# Patient Record
Sex: MALE | Race: WHITE
[De-identification: names, ages, dates, MRNs, and addresses within clinical notes are randomized per-mention and may not be internally consistent; named-entity substitution may affect disease eponyms.]

---

## 2018-09-17 ENCOUNTER — HOSPITAL ENCOUNTER (INPATIENT)
Dept: HOSPITAL 74 - JER | Age: 53
Discharge: HOME | DRG: 861 | End: 2018-09-17
Attending: INTERNAL MEDICINE | Admitting: INTERNAL MEDICINE
Payer: COMMERCIAL

## 2018-09-17 VITALS — DIASTOLIC BLOOD PRESSURE: 84 MMHG | HEART RATE: 61 BPM | TEMPERATURE: 98.1 F | SYSTOLIC BLOOD PRESSURE: 150 MMHG

## 2018-09-17 VITALS — BODY MASS INDEX: 30.9 KG/M2

## 2018-09-17 DIAGNOSIS — Z79.4: ICD-10-CM

## 2018-09-17 DIAGNOSIS — R60.0: Primary | ICD-10-CM

## 2018-09-17 DIAGNOSIS — Z87.891: ICD-10-CM

## 2018-09-17 DIAGNOSIS — E78.5: ICD-10-CM

## 2018-09-17 DIAGNOSIS — I11.9: ICD-10-CM

## 2018-09-17 DIAGNOSIS — E11.40: ICD-10-CM

## 2018-09-17 DIAGNOSIS — E11.9: ICD-10-CM

## 2018-09-17 DIAGNOSIS — T39.395A: ICD-10-CM

## 2018-09-17 LAB
ALBUMIN SERPL-MCNC: 3.4 G/DL (ref 3.4–5)
ALP SERPL-CCNC: 62 U/L (ref 45–117)
ALT SERPL-CCNC: 42 U/L (ref 13–61)
ANION GAP SERPL CALC-SCNC: 9 MMOL/L (ref 8–16)
AST SERPL-CCNC: 25 U/L (ref 15–37)
BASOPHILS # BLD: 1 % (ref 0–2)
BILIRUB SERPL-MCNC: 0.4 MG/DL (ref 0.2–1)
BNP SERPL-MCNC: 440.01 PG/ML (ref 5–125)
BUN SERPL-MCNC: 11 MG/DL (ref 7–18)
CALCIUM SERPL-MCNC: 8.4 MG/DL (ref 8.5–10.1)
CHLORIDE SERPL-SCNC: 105 MMOL/L (ref 98–107)
CO2 SERPL-SCNC: 28 MMOL/L (ref 21–32)
CREAT SERPL-MCNC: 0.7 MG/DL (ref 0.55–1.3)
DEPRECATED RDW RBC AUTO: 14.5 % (ref 11.9–15.9)
EOSINOPHIL # BLD: 2.3 % (ref 0–4.5)
GLUCOSE SERPL-MCNC: 90 MG/DL (ref 74–106)
HCT VFR BLD CALC: 35.5 % (ref 35.4–49)
HGB BLD-MCNC: 11.6 GM/DL (ref 11.7–16.9)
INR BLD: 0.93 (ref 0.83–1.09)
LYMPHOCYTES # BLD: 17.5 % (ref 8–40)
MAGNESIUM SERPL-MCNC: 2.2 MG/DL (ref 1.8–2.4)
MCH RBC QN AUTO: 28.4 PG (ref 25.7–33.7)
MCHC RBC AUTO-ENTMCNC: 32.8 G/DL (ref 32–35.9)
MCV RBC: 86.7 FL (ref 80–96)
MONOCYTES # BLD AUTO: 9.5 % (ref 3.8–10.2)
NEUTROPHILS # BLD: 69.7 % (ref 42.8–82.8)
PLATELET # BLD AUTO: 226 K/MM3 (ref 134–434)
PMV BLD: 8.5 FL (ref 7.5–11.1)
POTASSIUM SERPLBLD-SCNC: 4.2 MMOL/L (ref 3.5–5.1)
PROT SERPL-MCNC: 6.2 G/DL (ref 6.4–8.2)
PT PNL PPP: 10.5 SEC (ref 9.7–13)
RBC # BLD AUTO: 4.1 M/MM3 (ref 4–5.6)
SODIUM SERPL-SCNC: 142 MMOL/L (ref 136–145)
WBC # BLD AUTO: 8.4 K/MM3 (ref 4–10)

## 2018-09-17 PROCEDURE — G0378 HOSPITAL OBSERVATION PER HR: HCPCS

## 2018-09-17 NOTE — HP
Admitting History and Physical





- Primary Care Physician


PCP: Analia Bliss





- Admission


Chief Complaint: sob


History of Present Illness: 





52M with history of with history of IDDM, HTN, and neuropathy here today 

complaining of bilateral leg edema for the past week. He endorses associated 

shortness of breath, dyspnea on exertion and worsening shortness of breath when 

laying flat. Denies chest pain at this time, but states that he has had issues 

with chest pain recently. Denies fevers, chills, nausea, vomiting. Denies prior 

blood clots. Denies history of CHF and taking lasix.





 





- Past Medical History


Cardiovascular: Yes: HTN, Hyperlipdemia


Endocrine: Yes: Diabetes Mellitus





- Advance Directives


Advance Directives: Yes: Living Will





- Smoking History


Smoking history: Former smoker


Have you smoked in the past 12 months: Yes


Aproximately how many cigarettes per day: 4





- Alcohol/Substance Use


Hx Alcohol Use: No





Home Medications





- Allergies


Allergies/Adverse Reactions: 


 Allergies











Allergy/AdvReac Type Severity Reaction Status Date / Time


 


No Known Allergies Allergy   Verified 09/17/18 09:33














- Home Medications


Home Medications: 


Ambulatory Orders





Albuterol Sulfate Inhaler - [Ventolin Hfa Inhaler -] 1 - 2 inh PO Q4H PRN 09/17/ 18 


Atorvastatin Ca [Lipitor] 40 mg PO HS 09/17/18 


Enalapril Maleate [Vasotec -] 5 mg PO DAILY 09/17/18 


Escitalopram Oxalate [Lexapro -] 20 mg PO DAILY 09/17/18 


Fluticasone Prop 0.05% Nasal [Flonase -] 1 - 2 spray NS DAILY 09/17/18 


Fluticasone Propionate [Flovent Diskus] 2 puff IH BID 09/17/18 


Gabapentin 800 mg PO BID PRN 09/17/18 


Insulin Detemir [Levemir Flextouch] 10 unit SQ HS 09/17/18 


Insulin Glargine,Hum.rec.anlog [Lantus] 0 unit SQ ASDIR 09/17/18 


Loratadine 10 mg PO 09/17/18 


Loratadine [Claritin -] 10 mg PO DAILY 09/17/18 


Meloxicam [Mobic] 15 mg PO DAILY 09/17/18 


Omeprazole 40 mg PO DAILY 09/17/18 


Pramipexole Di-HCl [Pramipexole Dihydrochloride] 0.125 mg PO DAILY 09/17/18 











Physical Examination


Vital Signs: 


 Vital Signs











Temperature  98.0 F   09/17/18 16:57


 


Pulse Rate  63   09/17/18 16:57


 


Respiratory Rate  20   09/17/18 16:57


 


Blood Pressure  143/91   09/17/18 16:57


 


O2 Sat by Pulse Oximetry (%)  94 L  09/17/18 15:09











Constitutional: Yes: No Distress


HENT: Yes: Atraumatic


Neck: Yes: Supple


Cardiovascular: Yes: Regular Rate and Rhythm


Respiratory: Yes: CTA Bilaterally


Gastrointestinal: Yes: Normal Bowel Sounds


Extremities: Yes: WNL


Edema: No


Peripheral Pulses WNL: Yes


Neurological: Yes: Alert, Oriented


Labs: 


 CBC, BMP





 09/17/18 10:00 





 09/17/18 10:00 











Problem List





- Problems


(1) HTN (hypertension)


Assessment/Plan: 


continue home meds


cardiology note reviewed





Code(s): I10 - ESSENTIAL (PRIMARY) HYPERTENSION   


Qualifiers: 


   Hypertension type: essential hypertension   Qualified Code(s): I10 - 

Essential (primary) hypertension   





(2) Diabetes


Code(s): E11.9 - TYPE 2 DIABETES MELLITUS WITHOUT COMPLICATIONS   


Qualifiers: 


   Diabetes mellitus long term insulin use: with long term use   Diabetes 

mellitus complication detail: with unspecified neuropathy 





(3) CHF (congestive heart failure)


Assessment/Plan: 


pt stable


no peripheral edema or rales


Code(s): I50.9 - HEART FAILURE, UNSPECIFIED   





Assessment/Plan





 Laboratory Tests











  09/17/18 09/17/18 09/17/18





  10:00 10:00 10:00


 


WBC  8.4  


 


RBC  4.10  


 


Hgb  11.6 L  


 


Hct  35.5  


 


MCV  86.7  


 


MCH  28.4  


 


MCHC  32.8  


 


RDW  14.5  


 


Plt Count  226  D  


 


MPV  8.5  


 


Absolute Neuts (auto)  5.8  


 


Neutrophils %  69.7  


 


Lymphocytes %  17.5  


 


Monocytes %  9.5  


 


Eosinophils %  2.3  D  


 


Basophils %  1.0  D  


 


Nucleated RBC %  0  


 


PT with INR   10.50 


 


INR   0.93 


 


Sodium    142


 


Potassium    4.2


 


Chloride    105


 


Carbon Dioxide    28


 


Anion Gap    9


 


BUN    11


 


Creatinine    0.7


 


Creat Clearance w eGFR    > 60


 


POC Glucometer   


 


Random Glucose    90


 


Calcium    8.4 L


 


Magnesium    2.2


 


Total Bilirubin    0.4


 


AST    25


 


ALT    42


 


Alkaline Phosphatase    62


 


Creatine Kinase    153


 


Creatine Kinase Index    1.1


 


CK-MB (CK-2)    1.72


 


Troponin I    < 0.02


 


B-Natriuretic Peptide    440.01 H


 


Total Protein    6.2 L


 


Albumin    3.4














  09/17/18 09/17/18





  10:00 16:19


 


WBC  


 


RBC  


 


Hgb  


 


Hct  


 


MCV  


 


MCH  


 


MCHC  


 


RDW  


 


Plt Count  


 


MPV  


 


Absolute Neuts (auto)  


 


Neutrophils %  


 


Lymphocytes %  


 


Monocytes %  


 


Eosinophils %  


 


Basophils %  


 


Nucleated RBC %  


 


PT with INR  


 


INR  


 


Sodium  


 


Potassium  


 


Chloride  


 


Carbon Dioxide  


 


Anion Gap  


 


BUN  


 


Creatinine  


 


Creat Clearance w eGFR  


 


POC Glucometer   84


 


Random Glucose  


 


Calcium  


 


Magnesium  


 


Total Bilirubin  


 


AST  


 


ALT  


 


Alkaline Phosphatase  


 


Creatine Kinase  


 


Creatine Kinase Index  


 


CK-MB (CK-2)  


 


Troponin I  


 


B-Natriuretic Peptide  Cancelled 


 


Total Protein  


 


Albumin  








Active Medications











Generic Name Dose Route Start Last Admin





  Trade Name Freq  PRN Reason Stop Dose Admin


 


Atorvastatin Calcium  40 mg  09/17/18 22:00  09/17/18 21:04





  Lipitor -  PO   40 mg





  [facility] SALAS   Administration





     





     





     





     


 


Enalapril Maleate  5 mg  09/18/18 10:00  





  Vasotec -  PO   





  DAILY SALAS   





     





     





     





     


 


Escitalopram Oxalate  20 mg  09/18/18 10:00  





  Lexapro -  PO   





  DAILY SALAS   





     





     





     





     


 


Gabapentin  800 mg  09/17/18 17:03  09/17/18 21:03





  Neurontin -  PO   800 mg





  BID PRN   Administration





  neuropathy   





     





     





     


 


Insulin Aspart  1 vial  09/17/18 22:00  09/17/18 21:13





  Novolog Vial Sliding Scale -  SQ   Not Given





  [facility]   





     





     





  Protocol   





     


 


Insulin Detemir  10 units  09/17/18 22:00  09/17/18 21:04





  Levemir Vial  SQ   10 unit





  HS SALAS   Administration





     





     





     





     


 


Mometasone Furoate  1 puff  09/17/18 22:00  09/17/18 21:04





  Asmanex 220mcg -  IH   1 puff





  [facility] SALAS   Administration





     





     





     





     


 


Pantoprazole Sodium  40 mg  09/18/18 10:00  





  Protonix -  PO   





  DAILY SALAS   





     





     





     





     


 


Pramipexole Dihydrochloride  0.125 mg  09/18/18 10:00  





  Mirapex -  PO   





  DAILY SALAS   





     





     





     





     








pt stable to be dc home


cleared by cardiology

## 2018-09-17 NOTE — DS
Physical Examination


Vital Signs: 


 Vital Signs











Temperature  98.1 F   09/17/18 21:20


 


Pulse Rate  61   09/17/18 21:20


 


Respiratory Rate  20   09/17/18 21:20


 


Blood Pressure  150/84   09/17/18 21:20


 


O2 Sat by Pulse Oximetry (%)  96   09/17/18 21:16











Labs: 


 CBC, BMP





 09/17/18 10:00 





 09/17/18 10:00 











Discharge Summary


Reason For Visit: ACUTE ON CHRONIC CONGESTIVE HEART FAILURE


Current Active Problems





CHF (congestive heart failure) (Acute)


Dependent edema (Acute)


Diabetes (Acute)


Diastolic dysfunction without heart failure (Acute)


HTN (hypertension) (Acute)


Hyperlipidemia associated with type 2 diabetes mellitus (Acute)


Reactive airway disease (Acute)








Condition: Stable





- Instructions


Referrals: 


Libra Chan MD [Primary Care Provider] - 





- Home Medications


Comprehensive Discharge Medication List: 


Ambulatory Orders





Albuterol Sulfate Inhaler - [Ventolin Hfa Inhaler -] 1 - 2 inh PO Q4H PRN 09/17/ 18 


Atorvastatin Ca [Lipitor] 40 mg PO HS 09/17/18 


Enalapril Maleate [Vasotec -] 5 mg PO DAILY 09/17/18 


Escitalopram Oxalate [Lexapro -] 20 mg PO DAILY 09/17/18 


Fluticasone Prop 0.05% Nasal [Flonase -] 1 - 2 spray NS DAILY 09/17/18 


Fluticasone Propionate [Flovent Diskus] 2 puff IH BID 09/17/18 


Gabapentin 800 mg PO BID PRN 09/17/18 


Insulin Detemir [Levemir Flextouch] 10 unit SQ HS 09/17/18 


Insulin Glargine,Hum.rec.anlog [Lantus] 0 unit SQ ASDIR 09/17/18 


Loratadine 10 mg PO 09/17/18 


Loratadine [Claritin -] 10 mg PO DAILY 09/17/18 


Meloxicam [Mobic] 15 mg PO DAILY 09/17/18 


Omeprazole 40 mg PO DAILY 09/17/18 


Pramipexole Di-HCl [Pramipexole Dihydrochloride] 0.125 mg PO DAILY 09/17/18 





MI home

## 2018-09-17 NOTE — EKG
Test Reason : 

Blood Pressure : ***/*** mmHG

Vent. Rate : 068 BPM     Atrial Rate : 068 BPM

   P-R Int : 184 ms          QRS Dur : 100 ms

    QT Int : 396 ms       P-R-T Axes : 064 075 038 degrees

   QTc Int : 421 ms

 

NORMAL SINUS RHYTHM

NORMAL ECG

WHEN COMPARED WITH ECG OF 30-DEC-2012 16:01,

NO SIGNIFICANT CHANGE WAS FOUND

Confirmed by MARIO BOWIE MD ([id number]) on 9/17/2018 2:32:01 PM

 

Referred By:             Confirmed By:MARIO BOWIE MD

## 2018-09-17 NOTE — CON.CARD
Consult


Consult Specialty:: Cardiology


Referred by:: ER Medicine


Reason for Consultation:: Bilateral lower extremity edema





- History of Present Illness


Chief Complaint: Bilateral lower extremity edema


History of Present Illness: 


Patient is a 52M with history of with history of IDDM, HTN, hyperlipidemia, 

neuropathy, carpal tunnel syndrome presents with complaints of dependent 

bilateral leg edema for the past 2 weeks coinciding with initiation of Mobic 

use. He denies chest pain, shortness of breath, dyspnea on exertion, orthopnea, 

PND, near or true syncope, palpitations. 











- History Source


History Provided By: Patient


Limitations to Obtaining History: No Limitations





- Alcohol/Substance Use


Hx Alcohol Use: No





- Smoking History


Smoking history: Former smoker


Have you smoked in the past 12 months: Yes


Aproximately how many cigarettes per day: 4





Home Medications





- Allergies


Allergies/Adverse Reactions: 


 Allergies











Allergy/AdvReac Type Severity Reaction Status Date / Time


 


No Known Allergies Allergy   Verified 09/17/18 09:33














- Home Medications


Home Medications: 


Ambulatory Orders





Albuterol Sulfate Inhaler - [Ventolin Hfa Inhaler -] 1 - 2 inh PO Q4H PRN 09/17/ 18 


Atorvastatin Ca [Lipitor] 40 mg PO HS 09/17/18 


Enalapril Maleate [Vasotec -] 5 mg PO DAILY 09/17/18 


Escitalopram Oxalate [Lexapro -] 20 mg PO DAILY 09/17/18 


Fluticasone Prop 0.05% Nasal [Flonase -] 1 - 2 spray NS DAILY 09/17/18 


Fluticasone Propionate [Flovent Diskus] 2 puff IH BID 09/17/18 


Gabapentin 800 mg PO BID PRN 09/17/18 


Insulin Detemir [Levemir Flextouch] 10 unit SQ HS 09/17/18 


Insulin Glargine,Hum.rec.anlog [Lantus] 0 unit SQ ASDIR 09/17/18 


Loratadine 10 mg PO 09/17/18 


Loratadine [Claritin -] 10 mg PO DAILY 09/17/18 


Meloxicam [Mobic] 15 mg PO DAILY 09/17/18 


Omeprazole 40 mg PO DAILY 09/17/18 


Pramipexole Di-HCl [Pramipexole Dihydrochloride] 0.125 mg PO DAILY 09/17/18 











Family Disease History





- Family Disease History


Family Disease History: Diabetes: Grandparent, Father





Review of Systems





- Review of Systems


Constitutional: reports: No Symptoms


Eyes: reports: No Symptoms


HENT: reports: No Symptoms


Neck: reports: No Symptoms


Cardiovascular: reports: Edema


Respiratory: reports: No Symptoms


Gastrointestinal: reports: No Symptoms


Genitourinary: reports: No Symptoms


Musculoskeletal: reports: No Symptoms


Integumentary: reports: No Symptoms


Neurological: reports: No Symptoms


Endocrine: reports: No Symptoms


Vital Signs: 


 Vital Signs











Temperature  98.0 F   09/17/18 16:57


 


Pulse Rate  63   09/17/18 16:57


 


Respiratory Rate  20   09/17/18 16:57


 


Blood Pressure  143/91   09/17/18 16:57


 


O2 Sat by Pulse Oximetry (%)  94 L  09/17/18 15:09











Constitutional: Yes: No Distress, Calm


Neck: Yes: Supple


Respiratory: Yes: Regular, CTA Bilaterally


Gastrointestinal: Yes: Normal Bowel Sounds, Soft


Cardiovascular: Yes: Regular Rate and Rhythm


JVD: No


Carotid Bruit: No


Heart Sounds: Yes: S1, S2


Edema: Yes


Edema: LLE: Trace, RLE: Trace





- Other Data


Labs, Other Data: 


 CBC, BMP





 09/17/18 10:00 





 09/17/18 10:00 





 INR, PTT











INR  0.93  (0.83-1.09)   09/17/18  10:00    








 Troponin, BNP











  09/17/18 09/17/18





  10:00 10:00


 


Troponin I  < 0.02 


 


B-Natriuretic Peptide  440.01 H  Cancelled








 Troponin, BNP











  09/17/18 09/17/18





  10:00 10:00


 


Troponin I  < 0.02 


 


B-Natriuretic Peptide  440.01 H  Cancelled














NSR @ 68 without ST-T changes


Ejection Fraction %: LVEF > or = 40 %





Imaging





- Results


Chest X-ray: Report Reviewed (NAD)





Problem List





- Problems


(1) Diastolic dysfunction without heart failure


Code(s): I51.89 - OTHER ILL-DEFINED HEART DISEASES   





(2) Hyperlipidemia associated with type 2 diabetes mellitus


Code(s): E11.69 - TYPE 2 DIABETES MELLITUS WITH OTHER SPECIFIED COMPLICATION; 

E78.5 - HYPERLIPIDEMIA, UNSPECIFIED   





(3) Dependent edema


Code(s): R60.9 - EDEMA, UNSPECIFIED   





(4) Diabetes


Code(s): E11.9 - TYPE 2 DIABETES MELLITUS WITHOUT COMPLICATIONS   


Qualifiers: 


   Diabetes mellitus long term insulin use: with long term use   Diabetes 

mellitus complication detail: with unspecified neuropathy 





(5) HTN (hypertension)


Code(s): I10 - ESSENTIAL (PRIMARY) HYPERTENSION   


Qualifiers: 


   Hypertension type: essential hypertension   Qualified Code(s): I10 - 

Essential (primary) hypertension   





(6) Reactive airway disease


Code(s): J45.909 - UNSPECIFIED ASTHMA, UNCOMPLICATED   


Qualifiers: 


   Asthma persistence: intermittent   Asthma complication type: uncomplicated 





Assessment/Plan


1. Bilateral dependent edema associated with Mobic use


2. Diastolic dysfunction


3. Insulin-dependent Type 2 DM c/b neuropathy


4. Hyperlipidemia


5. Reactive airways disease





P:1. D/c mobic, ruling out for MI


2. Continue Vasotec 5 qd, lipitor 40 qhs


3. Ambulate patient, d/c planning


4. Thank you for consultative opportunity

## 2018-12-05 ENCOUNTER — HOSPITAL ENCOUNTER (OUTPATIENT)
Dept: HOSPITAL 74 - FASU | Age: 53
Discharge: HOME | End: 2018-12-05
Attending: ORTHOPAEDIC SURGERY
Payer: COMMERCIAL

## 2018-12-05 VITALS — SYSTOLIC BLOOD PRESSURE: 132 MMHG | DIASTOLIC BLOOD PRESSURE: 85 MMHG | HEART RATE: 60 BPM

## 2018-12-05 VITALS — TEMPERATURE: 97.7 F

## 2018-12-05 VITALS — BODY MASS INDEX: 29 KG/M2

## 2018-12-05 DIAGNOSIS — G56.01: Primary | ICD-10-CM

## 2018-12-05 PROCEDURE — 01N50ZZ RELEASE MEDIAN NERVE, OPEN APPROACH: ICD-10-PCS | Performed by: ORTHOPAEDIC SURGERY

## 2018-12-06 NOTE — OP
DATE OF OPERATION:  12/05/2018

 

PREOPERATIVE DIAGNOSIS:  Right carpal tunnel syndrome.

 

POSTOPERATIVE DIAGNOSIS:  Right carpal tunnel syndrome.

 

OPERATIVE PROCEDURE:  Right carpal tunnel release.

 

ANESTHESIA:  Local with sedation.

 

COMPLICATIONS:  None.

 

ESTIMATED BLOOD LOSS:  Minimal.

 

INDICATION FOR PROCEDURE:  The patient is a 52-year-old male with the above finding

indicated for operative treatment.  Risks, benefits, and alternatives were discussed

with the patient at length.  Proper informed consent was obtained.

 

PROCEDURE:  After proper identification of patient and correct operative site,

patient was brought to the operating room and placed supine on the operative table. 

Prominences were well padded.  Sedation and local anesthesia were given.  Right upper

extremity was prepped and draped in usual sterile fashion.  A well-padded tourniquet

was placed as well as a sterile prep.  Esmarch bandage was used to exsanguinate the

right upper extremity.  Tourniquet was inflated to 250 mmHg.

 

A longitudinal incision was made in the proximal aspect of the palm.  Incision was

taken sharply through the skin with blunt and sharp dissection through the

subcutaneous tissues.  Palmar fascia was divided longitudinally.  Transcarpal

ligament was divided longitudinally along with the distal 4 cm of the antebrachial

fascia under direct visualization with loupe magnification.  This provided complete

release of the median nerve at the wrist.  Wound was irrigated with saline and

repaired with a 5-0 nylon suture.  Sterile dressings were applied.

 

Patient was brought to Recovery in stable condition.  He tolerated procedure well.

 

 

AXEL [id number]

DD: 12/06/2018 10:07

DT: 12/06/2018 11:00

Job #:  [id number]

## 2021-05-12 ENCOUNTER — HOSPITAL ENCOUNTER (EMERGENCY)
Dept: HOSPITAL 74 - JER | Age: 56
Discharge: HOME | End: 2021-05-12
Payer: COMMERCIAL

## 2021-05-12 VITALS — SYSTOLIC BLOOD PRESSURE: 151 MMHG | DIASTOLIC BLOOD PRESSURE: 82 MMHG | TEMPERATURE: 98.1 F | HEART RATE: 85 BPM

## 2021-05-12 VITALS — BODY MASS INDEX: 30.3 KG/M2

## 2021-05-12 DIAGNOSIS — R10.13: Primary | ICD-10-CM

## 2021-05-12 LAB
ALBUMIN SERPL-MCNC: 4.2 G/DL (ref 3.4–5)
ALP SERPL-CCNC: 122 U/L (ref 45–117)
ALT SERPL-CCNC: 27 U/L (ref 13–61)
ANION GAP SERPL CALC-SCNC: 5 MMOL/L (ref 8–16)
AST SERPL-CCNC: 19 U/L (ref 15–37)
BASOPHILS # BLD: 1.2 % (ref 0–2)
BILIRUB SERPL-MCNC: 0.2 MG/DL (ref 0.2–1)
BUN SERPL-MCNC: 13.2 MG/DL (ref 7–18)
CALCIUM SERPL-MCNC: 8.8 MG/DL (ref 8.5–10.1)
CHLORIDE SERPL-SCNC: 104 MMOL/L (ref 98–107)
CO2 SERPL-SCNC: 30 MMOL/L (ref 21–32)
CREAT SERPL-MCNC: 0.9 MG/DL (ref 0.55–1.3)
DEPRECATED RDW RBC AUTO: 13.5 % (ref 11.9–15.9)
EOSINOPHIL # BLD: 2.4 % (ref 0–4.5)
GLUCOSE SERPL-MCNC: 116 MG/DL (ref 74–106)
HCT VFR BLD CALC: 40.8 % (ref 35.4–49)
HGB BLD-MCNC: 13.8 GM/DL (ref 11.7–16.9)
LIPASE SERPL-CCNC: 148 U/L (ref 73–393)
LYMPHOCYTES # BLD: 20.9 % (ref 8–40)
MCH RBC QN AUTO: 29 PG (ref 25.7–33.7)
MCHC RBC AUTO-ENTMCNC: 33.8 G/DL (ref 32–35.9)
MCV RBC: 85.9 FL (ref 80–96)
MONOCYTES # BLD AUTO: 7.6 % (ref 3.8–10.2)
NEUTROPHILS # BLD: 67.9 % (ref 42.8–82.8)
PLATELET # BLD AUTO: 264 K/MM3 (ref 134–434)
PMV BLD: 9.7 FL (ref 7.5–11.1)
PROT SERPL-MCNC: 7.6 G/DL (ref 6.4–8.2)
RBC # BLD AUTO: 4.75 M/MM3 (ref 4–5.6)
SODIUM SERPL-SCNC: 139 MMOL/L (ref 136–145)
WBC # BLD AUTO: 8.9 K/MM3 (ref 4–10)

## 2021-05-12 PROCEDURE — 3E033GC INTRODUCTION OF OTHER THERAPEUTIC SUBSTANCE INTO PERIPHERAL VEIN, PERCUTANEOUS APPROACH: ICD-10-PCS

## 2021-05-12 PROCEDURE — 3E0333Z INTRODUCTION OF ANTI-INFLAMMATORY INTO PERIPHERAL VEIN, PERCUTANEOUS APPROACH: ICD-10-PCS

## 2021-08-23 NOTE — PDOC
History of Present Illness





- General


Chief Complaint: Edema


Stated Complaint: BREATHING/LEGS SWOLLEN/BRONCHITIS


Time Seen by Provider: 09/17/18 09:46


History Source: Patient


Exam Limitations: No Limitations





- History of Present Illness


Initial Comments: 





09/17/18 11:59


Patient is a 52M with history of with history of IDDM, HTN, and neuropathy here 

today complaining of bilateral leg edema for the past week. He endorses 

associated shortness of breath, dyspnea on exertion and worsening shortness of 

breath when laying flat. Denies chest pain at this time, but states that he has 

had issues with chest pain recently. Denies fevers, chills, nausea, vomiting. 

Denies prior blood clots. Denies history of CHF and taking lasix.





Past History





- Past Medical History


Allergies/Adverse Reactions: 


 Allergies











Allergy/AdvReac Type Severity Reaction Status Date / Time


 


No Known Allergies Allergy   Verified 09/17/18 09:33











Home Medications: 


Ambulatory Orders





Albuterol Sulfate Inhaler - [Ventolin Hfa Inhaler -] 1 - 2 inh PO Q4H PRN 09/17/ 18 


Atorvastatin Ca [Lipitor] 40 mg PO HS 09/17/18 


Enalapril Maleate [Vasotec -] 5 mg PO DAILY 09/17/18 


Escitalopram Oxalate [Lexapro -] 20 mg PO DAILY 09/17/18 


Fluticasone Prop 0.05% Nasal [Flonase -] 1 - 2 spray NS DAILY 09/17/18 


Fluticasone Propionate [Flovent Diskus] 2 puff IH BID 09/17/18 


Gabapentin 800 mg PO BID PRN 09/17/18 


Insulin Detemir [Levemir Flextouch] 10 unit SQ HS 09/17/18 


Insulin Glargine,Hum.rec.anlog [Lantus] 0 unit SQ ASDIR 09/17/18 


Loratadine [Claritin -] 10 mg PO DAILY 09/17/18 


Meloxicam [Mobic] 15 mg PO DAILY 09/17/18 


Omeprazole 40 mg PO DAILY 09/17/18 


Pramipexole Di-HCl [Pramipexole Dihydrochloride] 0.125 mg PO DAILY 09/17/18 








COPD: No


Diabetes: Yes (IDDM)


HTN: Yes





- Suicide/Smoking/Psychosocial Hx


Smoking Status: Yes


Smoking History: Current some day smoker


Number of Cigarettes Smoked Daily: 5


Information on smoking cessation initiated: Yes


'Breaking Loose' booklet given: 09/17/18


Hx Alcohol Use: No


Drug/Substance Use Hx: No





**Review of Systems





- Review of Systems


Comments:: 





09/17/18 12:01


GENERAL/CONSTITUTIONAL: No fever or chills. No weakness.


HEAD, EYES, EARS, NOSE AND THROAT: No change in vision. No sore throat.


CARDIOVASCULAR: +chest pain +shortness of breath


RESPIRATORY: No cough, wheezing, or hemoptysis.


GASTROINTESTINAL: No nausea, vomiting, diarrhea or constipation.


GENITOURINARY: No dysuria, frequency, or change in urination.


MUSCULOSKELETAL: No joint pain. +edema bilaterally No neck or back pain.


SKIN: No rash


NEUROLOGIC: No headache, vertigo, loss of consciousness, or change in strength/

sensation.


ENDOCRINE: No increased thirst. No abnormal weight change


HEMATOLOGIC/LYMPHATIC: No anemia, easy bleeding, or history of blood clots.


ALLERGIC/IMMUNOLOGIC: No hives or skin allergy.











*Physical Exam





- Vital Signs


 Last Vital Signs











Temp Pulse Resp BP Pulse Ox


 


 98.6 F   77   20   139/84   99 


 


 09/17/18 09:33  09/17/18 09:33  09/17/18 09:33  09/17/18 09:33  09/17/18 09:33














- Physical Exam


Comments: 





09/17/18 12:02


GENERAL: Awake, alert, and fully oriented, in no acute distress


HEAD: No signs of trauma, normocephalic, atraumatic


EYES: PERRLA, EOMI, sclera anicteric, conjunctiva clear


ENT: Auricles normal inspection, hearing grossly normal, nares patent, 

oropharynx clear without


exudates. Moist mucosa


NECK: Normal ROM, supple, no lymphadenopathy, JVD, or masses


LUNGS: No distress, speaks full sentences, scattered wheezing bilaterally


HEART: Regular rate and rhythm, normal S1 and S2, no murmurs, rubs or gallops, 

peripheral pulses normal and equal bilaterally.


ABDOMEN: Soft, nontender, normoactive bowel sounds.  No guarding, no rebound.  

No masses


EXTREMITIES: Normal inspection, Normal range of motion, 2+ edema bilaterally.  

No clubbing or cyanosis.


NEUROLOGICAL: Cranial nerves II through XII grossly intact.  Normal speech, 

normal gait, no focal sensorimotor deficits


SKIN: Warm, Dry, normal turgor, no rashes or lesions noted.








ED Treatment Course





- LABORATORY


CBC & Chemistry Diagram: 


 09/17/18 10:00





 09/17/18 10:00





- ADDITIONAL ORDERS


Additional order review: 


 Laboratory  Results











  09/17/18 09/17/18 09/17/18





  10:00 10:00 10:00


 


PT with INR    10.50


 


INR    0.93


 


Sodium   142 


 


Potassium   4.2 


 


Chloride   105 


 


Carbon Dioxide   28 


 


Anion Gap   9 


 


BUN   11 


 


Creatinine   0.7 


 


Creat Clearance w eGFR   > 60 


 


Random Glucose   90 


 


Calcium   8.4 L 


 


Magnesium   2.2 


 


Total Bilirubin   0.4 


 


AST   25 


 


ALT   42 


 


Alkaline Phosphatase   62 


 


Creatine Kinase   153 


 


Troponin I   < 0.02 


 


B-Natriuretic Peptide  Cancelled  


 


Total Protein   6.2 L 


 


Albumin   3.4 








 











  09/17/18





  10:00


 


RBC  4.10


 


MCV  86.7


 


MCHC  32.8


 


RDW  14.5


 


MPV  8.5


 


Neutrophils %  69.7


 


Lymphocytes %  17.5


 


Monocytes %  9.5


 


Eosinophils %  2.3  D


 


Basophils %  1.0  D














- RADIOLOGY


Radiology Studies Ordered: 














 Category Date Time Status


 


 CHEST X-RAY PORTABLE* [RAD] Stat Radiology  09/17/18 09:57 Completed














Medical Decision Making





- Medical Decision Making





09/17/18 12:03


Patient is 52M here today with shortness of breath, bilateral leg swelling. 

Likely secondary to new onset CHF. Will do cardiac workup, likely admit.





EKG shows normal sinus rhythm with rate of 68. No st elevations/depressions. 

Normal axis. No significant t wave abnormalities. Normal intervals. 





CXR shows evidence of fluid overload.





CBC, CMP reassuring. Troponin undetectable. Will admit for new onset CHF.





*DC/Admit/Observation/Transfer


Diagnosis at time of Disposition: 


 CHF (congestive heart failure)








- Discharge Dispostion


Condition at time of disposition: Stable


Decision to Admit order: Yes





- Referrals


Referrals: 


Libra Chan MD [Primary Care Provider] - 





- Patient Instructions





- Post Discharge Activity 185.42 [phone]